# Patient Record
Sex: MALE | Race: WHITE | NOT HISPANIC OR LATINO | Employment: FULL TIME | URBAN - METROPOLITAN AREA
[De-identification: names, ages, dates, MRNs, and addresses within clinical notes are randomized per-mention and may not be internally consistent; named-entity substitution may affect disease eponyms.]

---

## 2017-01-03 ENCOUNTER — OFFICE VISIT (OUTPATIENT)
Dept: UROLOGY | Facility: CLINIC | Age: 42
End: 2017-01-03
Attending: UROLOGY
Payer: COMMERCIAL

## 2017-01-03 VITALS
WEIGHT: 275 LBS | BODY MASS INDEX: 37.25 KG/M2 | DIASTOLIC BLOOD PRESSURE: 87 MMHG | HEART RATE: 98 BPM | HEIGHT: 72 IN | SYSTOLIC BLOOD PRESSURE: 161 MMHG

## 2017-01-03 DIAGNOSIS — R31.29 MICROHEMATURIA: Primary | ICD-10-CM

## 2017-01-03 PROCEDURE — 1159F MED LIST DOCD IN RCRD: CPT | Mod: S$GLB,,, | Performed by: UROLOGY

## 2017-01-03 PROCEDURE — 99204 OFFICE O/P NEW MOD 45 MIN: CPT | Mod: 25,S$GLB,, | Performed by: UROLOGY

## 2017-01-03 PROCEDURE — 81002 URINALYSIS NONAUTO W/O SCOPE: CPT | Mod: S$GLB,,, | Performed by: UROLOGY

## 2017-01-03 PROCEDURE — 99999 PR PBB SHADOW E&M-NEW PATIENT-LVL III: CPT | Mod: PBBFAC,,, | Performed by: UROLOGY

## 2017-01-03 RX ORDER — ATORVASTATIN CALCIUM 20 MG/1
20 TABLET, FILM COATED ORAL DAILY
COMMUNITY
Start: 2016-11-21

## 2017-01-03 RX ORDER — PANTOPRAZOLE SODIUM 40 MG/1
TABLET, DELAYED RELEASE ORAL
Refills: 3 | COMMUNITY
Start: 2016-11-18

## 2017-01-03 RX ORDER — HYDROCHLOROTHIAZIDE 12.5 MG/1
12.5 CAPSULE ORAL DAILY
COMMUNITY
Start: 2016-11-21

## 2017-01-03 RX ORDER — LISINOPRIL 10 MG/1
TABLET ORAL DAILY
COMMUNITY
Start: 2016-11-21

## 2017-01-03 NOTE — PROGRESS NOTES
Subjective:      Aaron Hogan is a 41 y.o. male who was referred by Dr Villeda for evaluation of microscopic hematuria.    Life insurance physical showed 11 red blood cells per high-power field.  Otherwise he urine was negative.  The patient has no urinary symptoms.  No flank pain.  No history of kidney stones.  His father was a previous patient of mine who passed away from metastatic kidney cancer number of years ago      nonsmoker      The following portions of the patient's history were reviewed and updated as appropriate: allergies, current medications, past family history, past medical history, past social history, past surgical history and problem list.    Review of Systems  Constitutional: no fever or chills  ENT: no nasal congestion or sore throat  Respiratory: no cough or shortness of breath  Cardiovascular: no chest pain or palpitations  Gastrointestinal: no nausea or vomiting, tolerating diet  Genitourinary: as per HPI  Hematologic/Lymphatic: no easy bruising or lymphadenopathy  Musculoskeletal: no arthralgias or myalgias  Neurological: no seizures or tremors  Behavioral/Psych: no auditory or visual hallucinations     Objective:   Vitals:   Visit Vitals    BP (!) 161/87 (BP Location: Right arm, Patient Position: Sitting, BP Method: Automatic)    Pulse 98    Ht 6' (1.829 m)    Wt 124.7 kg (275 lb)    BMI 37.3 kg/m2       Physical Exam   General: alert and oriented, no acute distress  Head: normocephalic, atraumatic  Neck: supple, no lymphadenopathy, normal ROM  Respiratory: Symmetric expansion, non-labored breathing  Cardiovascular: no peripheral edema  Abdomen: soft, non tender, non distended, no palpable masses, no hernias, no hepatomegaly or splenomegaly  Genitourinary:   Penis: normal, no lesions, patent orthotopic meatus, no plaques  Scrotum: no rashes or skin changes;   Testes: descended bilaterally, no masses, nontender, normal epididymides bilaterally, no hydroceles  Prostate: normal for  age, normal consistency, non tender, no specific nodules; seminal vesicles not palpated  Rectum: normal rectal tone, no rectal mass, normal perineum  Lymphatic: no inguinal nodes  Skin: normal coloration and turgor, no rashes, no suspicious skin lesions noted  Neuro: alert and oriented x3, no gross deficits  Psych: normal judgment and insight, normal mood/affect and non-anxious    Physical Exam    Lab Review   Urinalysis demonstrates microscopic urinalysis 3 red blood cells per high-power field otherwise negative    No results found for: WBC, HGB, HCT, MCV, PLT  No results found for: CREATININE, BUN  No results found for: PSA  Imaging  -  Assessment:     1. Microhematuria        Plan:     Orders Placed This Encounter    Cystoscopy    CT Urogram Abd Pelvis W WO

## 2017-01-03 NOTE — MR AVS SNAPSHOT
[facility]  [address] [place] [address], [address]  [facility] [address]  Phone: [phone]                  Aaron Hogan   1/3/2017 2:15 PM   Office Visit    Description:  Male : 1975   Provider:  Dimitri Villegas MD   Department:  [facility]           Reason for Visit     Advice Only           Diagnoses this Visit        Comments    Microhematuria    -  Primary            To Do List           Goals (5 Years of Data)     None      [facility]     [facility] Nurse Care Line -  Assistance  Registered nurses in the [facility] On Call Center provide clinical advisement, health education, appointment booking, and other advisory services.  Call for this free service at [phone].             Medications           Message regarding Medications     Verify the changes and/or additions to your medication regime listed below are the same as discussed with your clinician today.  If any of these changes or additions are incorrect, please notify your healthcare provider.             Verify that the below list of medications is an accurate representation of the medications you are currently taking.  If none reported, the list may be blank. If incorrect, please contact your healthcare provider. Carry this list with you in case of emergency.           Current Medications     atorvastatin (LIPITOR) 20 MG tablet Take 20 mg by mouth once daily.     hydrochlorothiazide (MICROZIDE) 12.5 mg capsule Take 12.5 mg by mouth once daily.     lisinopril 10 MG tablet Take by mouth once daily.     pantoprazole (PROTONIX) 40 MG tablet TK 1 T PO week           Clinical Reference Information           Vital Signs - Last Recorded  Most recent update: 1/3/2017  2:20 PM by Mahi Medina RN    BP Pulse Ht Wt BMI    (!) 161/87 (BP Location: Right arm, Patient Position: Sitting, BP Method: Automatic) 98 6' (1.829 m) 124.7 kg (275 lb) 37.3 kg/m2      Blood Pressure          Most Recent Value    BP  (!)  161/87       Allergies as of 1/3/2017     No Known Allergies      Immunizations Administered on Date of Encounter - 1/3/2017     None      Orders Placed During Today's Visit     Future Labs/Procedures Expected by Expires    CT Urogram Abd Pelvis W WO  1/3/2017 1/3/2018    Cystoscopy  As directed 1/3/2018

## 2017-01-04 LAB
BILIRUB SERPL-MCNC: ABNORMAL MG/DL
BLOOD URINE, POC: 250
COLOR, POC UA: ABNORMAL
GLUCOSE UR QL STRIP: ABNORMAL
KETONES UR QL STRIP: ABNORMAL
LEUKOCYTE ESTERASE URINE, POC: ABNORMAL
NITRITE, POC UA: ABNORMAL
PH, POC UA: 7
PROTEIN, POC: ABNORMAL
SPECIFIC GRAVITY, POC UA: 1.01
UROBILINOGEN, POC UA: ABNORMAL

## 2017-01-10 ENCOUNTER — HOSPITAL ENCOUNTER (OUTPATIENT)
Dept: RADIOLOGY | Facility: HOSPITAL | Age: 42
Discharge: HOME | End: 2017-01-10
Attending: UROLOGY
Payer: COMMERCIAL

## 2017-01-10 DIAGNOSIS — R31.29 MICROHEMATURIA: ICD-10-CM

## 2017-01-10 PROCEDURE — 74178 CT ABD&PLV WO CNTR FLWD CNTR: CPT | Mod: 26,,, | Performed by: RADIOLOGY

## 2017-01-10 PROCEDURE — 74178 CT ABD&PLV WO CNTR FLWD CNTR: CPT | Mod: TC

## 2017-01-10 PROCEDURE — 25500020 PHARM REV CODE 255: Performed by: UROLOGY

## 2017-01-10 RX ADMIN — IOHEXOL 125 ML: 350 INJECTION, SOLUTION INTRAVENOUS at 11:01

## 2017-01-13 ENCOUNTER — TELEPHONE (OUTPATIENT)
Dept: UROLOGY | Facility: CLINIC | Age: 42
End: 2017-01-13

## 2017-01-13 ENCOUNTER — PROCEDURE VISIT (OUTPATIENT)
Dept: UROLOGY | Facility: CLINIC | Age: 42
End: 2017-01-13
Attending: UROLOGY
Payer: COMMERCIAL

## 2017-01-13 VITALS
SYSTOLIC BLOOD PRESSURE: 150 MMHG | DIASTOLIC BLOOD PRESSURE: 91 MMHG | HEIGHT: 72 IN | BODY MASS INDEX: 37.25 KG/M2 | WEIGHT: 275 LBS | HEART RATE: 105 BPM

## 2017-01-13 DIAGNOSIS — R31.29 MICROHEMATURIA: ICD-10-CM

## 2017-01-13 PROCEDURE — 52000 CYSTOURETHROSCOPY: CPT | Mod: S$GLB,,, | Performed by: UROLOGY

## 2017-01-13 RX ORDER — CIPROFLOXACIN 500 MG/1
500 TABLET ORAL
Status: COMPLETED | OUTPATIENT
Start: 2017-01-13 | End: 2017-01-13

## 2017-01-13 RX ORDER — LIDOCAINE HYDROCHLORIDE 20 MG/ML
JELLY TOPICAL
Status: COMPLETED | OUTPATIENT
Start: 2017-01-13 | End: 2017-01-13

## 2017-01-13 RX ADMIN — CIPROFLOXACIN 500 MG: 500 TABLET ORAL at 02:01

## 2017-01-13 RX ADMIN — LIDOCAINE HYDROCHLORIDE: 20 JELLY TOPICAL at 02:01

## 2017-01-13 NOTE — TELEPHONE ENCOUNTER
----- Message from Dimirti Villegas MD sent at 1/13/2017  3:30 PM CST -----  Please fax CT results to dr laura gerber    Thanks  sc

## 2017-01-13 NOTE — MR AVS SNAPSHOT
[facility]  [address] [place] [address], [address]  [facility] [address]  Phone: [phone]                  Aaron Hogan   2017 2:45 PM   Procedure visit    Description:  Male : 1975   Provider:  Dimitri Villegas MD   Department:  [facility]           Reason for Visit     Follow-up           Diagnoses this Visit        Comments    Microhematuria                To Do List           Goals (5 Years of Data)     None      Follow-Up and Disposition     Return in about 6 months (around 2017).      [facility]     [facility] On Call Nurse Care Line -  Assistance  Registered nurses in the [facility] On Call Center provide clinical advisement, health education, appointment booking, and other advisory services.  Call for this free service at [phone].             Medications           Message regarding Medications     Verify the changes and/or additions to your medication regime listed below are the same as discussed with your clinician today.  If any of these changes or additions are incorrect, please notify your healthcare provider.        These medications were administered today        Dose Freq    lidocaine HCl 2% urojet  Clinic/HOD 1 time    Sig: by Mucous Membrane route one time.    Class: Normal    Route: Mucous Membrane    ciprofloxacin HCl tablet 500 mg 500 mg Clinic/HOD 1 time    Sig: Take 1 tablet (500 mg total) by mouth one time.    Class: Normal    Route: Oral           Verify that the below list of medications is an accurate representation of the medications you are currently taking.  If none reported, the list may be blank. If incorrect, please contact your healthcare provider. Carry this list with you in case of emergency.           Current Medications     atorvastatin (LIPITOR) 20 MG tablet Take 20 mg by mouth once daily.     hydrochlorothiazide (MICROZIDE) 12.5 mg capsule Take 12.5 mg by mouth once daily.     lisinopril 10 MG tablet Take by mouth once daily.      pantoprazole (PROTONIX) 40 MG tablet TK 1 T PO week           Clinical Reference Information           Vital Signs - Last Recorded  Most recent update: 1/13/2017  3:06 PM by Mahi Medina RN    BP Pulse Ht Wt BMI    (!) 150/91 (BP Location: Left arm, Patient Position: Sitting, BP Method: Automatic) 105 6' (1.829 m) 124.7 kg (275 lb) 37.3 kg/m2      Blood Pressure          Most Recent Value    BP  (!)  150/91      Allergies as of 1/13/2017     No Known Allergies      Immunizations Administered on Date of Encounter - 1/13/2017     None      Orders Placed During Today's Visit      Normal Orders This Visit    Cystoscopy

## 2017-01-13 NOTE — PROCEDURES
"Cystoscopy  Date/Time: 1/13/2017 3:28 PM  Performed by: STANLEY GUO  Authorized by: STANLEY GUO     Consent Done?:  Yes (Written)  Time out: Immediately prior to procedure a "time out" was called to verify the correct patient, procedure, equipment, support staff and site/side marked as required.    Position:  Supine  Anesthesia:  Lidocaine jelly  Patient sedated?: No    Preparation: Patient was prepped and draped in usual sterile fashion      Scope type:  Flexible cystoscope  External exam normal: Yes    Urethra normal: Yes    Prostate normal: Yes  Bladder neck normal: Bladder neck normal   Bladder normal: Yes      Patient tolerance:  Patient tolerated the procedure well with no immediate complications     CT urogram negative; diverticulosis; simple renal cyst    Microhematuria; negative evaluation; no evidence of  disease    RTC 6 months          "

## 2019-01-31 ENCOUNTER — OFFICE VISIT (OUTPATIENT)
Dept: URGENT CARE | Facility: CLINIC | Age: 44
End: 2019-01-31
Payer: COMMERCIAL

## 2019-01-31 VITALS
RESPIRATION RATE: 16 BRPM | BODY MASS INDEX: 36.57 KG/M2 | SYSTOLIC BLOOD PRESSURE: 126 MMHG | HEIGHT: 72 IN | DIASTOLIC BLOOD PRESSURE: 89 MMHG | OXYGEN SATURATION: 98 % | WEIGHT: 270 LBS | HEART RATE: 72 BPM | TEMPERATURE: 97 F

## 2019-01-31 DIAGNOSIS — M46.1 SACROILIITIS: Primary | ICD-10-CM

## 2019-01-31 DIAGNOSIS — M54.17 LUMBOSACRAL RADICULOPATHY: ICD-10-CM

## 2019-01-31 PROCEDURE — 3008F BODY MASS INDEX DOCD: CPT | Mod: CPTII,S$GLB,, | Performed by: EMERGENCY MEDICINE

## 2019-01-31 PROCEDURE — 99203 PR OFFICE/OUTPT VISIT, NEW, LEVL III, 30-44 MIN: ICD-10-PCS | Mod: 25,S$GLB,, | Performed by: EMERGENCY MEDICINE

## 2019-01-31 PROCEDURE — 3008F PR BODY MASS INDEX (BMI) DOCUMENTED: ICD-10-PCS | Mod: CPTII,S$GLB,, | Performed by: EMERGENCY MEDICINE

## 2019-01-31 PROCEDURE — 96372 THER/PROPH/DIAG INJ SC/IM: CPT | Mod: S$GLB,,, | Performed by: EMERGENCY MEDICINE

## 2019-01-31 PROCEDURE — 96372 PR INJECTION,THERAP/PROPH/DIAG2ST, IM OR SUBCUT: ICD-10-PCS | Mod: S$GLB,,, | Performed by: EMERGENCY MEDICINE

## 2019-01-31 PROCEDURE — 99203 OFFICE O/P NEW LOW 30 MIN: CPT | Mod: 25,S$GLB,, | Performed by: EMERGENCY MEDICINE

## 2019-01-31 RX ORDER — BETAMETHASONE SODIUM PHOSPHATE AND BETAMETHASONE ACETATE 3; 3 MG/ML; MG/ML
9 INJECTION, SUSPENSION INTRA-ARTICULAR; INTRALESIONAL; INTRAMUSCULAR; SOFT TISSUE
Status: COMPLETED | OUTPATIENT
Start: 2019-01-31 | End: 2019-01-31

## 2019-01-31 RX ORDER — CYCLOBENZAPRINE HCL 10 MG
10 TABLET ORAL 3 TIMES DAILY PRN
Qty: 30 TABLET | Refills: 1 | Status: SHIPPED | OUTPATIENT
Start: 2019-01-31 | End: 2019-02-10

## 2019-01-31 RX ORDER — METHYLPREDNISOLONE 4 MG/1
TABLET ORAL
Qty: 1 PACKAGE | Refills: 0 | Status: SHIPPED | OUTPATIENT
Start: 2019-01-31 | End: 2021-02-28

## 2019-01-31 RX ORDER — MELOXICAM 15 MG/1
15 TABLET ORAL DAILY
Qty: 30 TABLET | Refills: 2 | Status: SHIPPED | OUTPATIENT
Start: 2019-01-31 | End: 2019-05-01

## 2019-01-31 RX ADMIN — BETAMETHASONE SODIUM PHOSPHATE AND BETAMETHASONE ACETATE 9 MG: 3; 3 INJECTION, SUSPENSION INTRA-ARTICULAR; INTRALESIONAL; INTRAMUSCULAR; SOFT TISSUE at 10:01

## 2019-01-31 NOTE — PATIENT INSTRUCTIONS
No heavy lifting  Heating pad or hot tub in the morning and ice pack in the evening or night  Celestone shot 1.5 cc given in clinic  Medrol Dosepak prescription for the full course as treatment regimen.  Flexeril prescription while taking the Medrol Dosepak as treatment regimen.  After done with the Medrol Dosepak, take the Mobic (meloxicam) daily as anti-inflammatory/pain medicine.  Also okay to take Flexeril when having muscle stiffness or spasm or symptoms at night as this medicine does cause sedation.    Consider injections versus escalation of therapy and imaging with MRIs and consultation with Dr. Pierson with Orthopedics or neuro surgery in particular with Dr. Pierson as you have already planned.  Consider massage and physical therapy as well.    Review sacroiliitis sheet  Review lumbosacral radiculopathy sheet.    Return for any concerns or problems.    Sacroiliitis  The sacrum is the triangle-shaped bone at the base of the spine. It is linked to the other pelvis bones by the sacroiliac joints, also called SI joints. Sacroiliitis is when one or both SI joints are hurt or inflamed. It can make small movements of the lower back and pelvis very painful.        This condition has been linked to other diseases. They include ankylosing spondylitis, rheumatoid arthritis, psoriasis, and Crohns disease or colitis. Symptoms may include pain or stiffness in the hips, lower back, thighs, or buttocks. Pain occurs most often in the morning or after sitting for long periods of time. The pain may get worse when you walk. The swinging motion of the hips strains the SI joints.  Sacroiliitis is caused by many factors such as:  · Heavy lifting, especially if not done the right way  · Severe injury, such as a fall or car accident  · Osteoarthritis  · Pregnancy  · Infection of the joint  This condition is hard to diagnose. It may be confused with other causes of low back pain. To confirm the diagnosis, you may be given a shot of  numbing medicine in the SI joint. Treatment includes rest, physical therapy, and anti-inflammatory medicines. If another health problem is the cause, then that must also be treated. More testing may be needed if your symptoms dont get better.  Home care  · If your healthcare provider has prescribed medicines, take all of them as directed.  · You may use over-the-counter pain medicine to control pain, unless another medicine was prescribed. If prednisone was prescribed, dont use NSAIDs, or nonsteroidal anti-inflammatory drugs, such as ibuprofen or naproxen. Talk with your provider before using this medicine if you have chronic liver or kidney disease or ever had a stomach ulcer or GI bleeding.  · If you were referred to physical therapy, make an appointment. Be sure to do any prescribed exercises.  · Dont smoke. Smoking reduces blood flow to the inflamed area. This makes it harder to treat.  Follow-up care  Follow up with your healthcare provider, or as advised.  If you had an X-ray or an MRI, you will be notified of any new findings that may affect your care.  When to seek medical advice  Contact your healthcare provider right away if any of these occur:  · Increasing low back pain  · Inflammation of the eyes  · Skin rash or redness  · Weakness or numbness in one or both legs  · Loss of bowel or bladder control  · Numbness in the groin area  Date Last Reviewed: 11/24/2015  © 9440-2532 [employer]. [address] [place] [address]. All rights reserved. This information is not intended as a substitute for professional medical care. Always follow your healthcare professional's instructions.        Causes of Lumbar (Low Back) Pain  Low back pain can be caused by problems with any part of the lumbar spine. A disk can herniate (push out) and press on a nerve. Vertebrae can rub against each other or slip out of place. This can irritate facet joints and nerves. It can also lead to stenosis, a  narrowing of the spinal canal or foramen.  Pressure from a disk  Constant wear and tear on a disk can cause it to weaken and push outward. Part of the disk may then press on nearby nerves. There are two common types of herniated disks:  Contained means the soft nucleus is protruding outward.   Extruded means the firm annulus has torn, letting the soft center squeeze through.     Pressure from bone  An unstable spine   With age, a disk may thin and wear out. Vertebrae above and below the disk may begin to touch. This can put pressure on nerves. It can also cause bone spurs (growths) to form where the bones rub together.    Stenosis results when bone spurs narrow the foramen or spinal canal. This also puts pressure on nerves. Slipping vertebrae can irritate nerves and joints. They can also worsen stenosis.    In some cases, vertebrae become unstable and slip forward. This is called spondylolisthesis.     Date Last Reviewed: 10/12/2015  © 9479-6415 [employer]. [address] [place] [address], [place] [address]. All rights reserved. This information is not intended as a substitute for professional medical care. Always follow your healthcare professional's instructions.

## 2019-01-31 NOTE — PROGRESS NOTES
Subjective:       Patient ID: Aaron Hogan is a 44 y.o. male.    Vitals:    01/31/19 0949   BP: 126/89   Pulse: 72   Resp: 16   Temp: 97 °F (36.1 °C)   SpO2: 98%   Weight: 122.5 kg (270 lb)   Height: 6' (1.829 m)       Chief Complaint: Back Pain    Pt states right sided  recurrent back pain that returned 3 days ago.  REPORTS THAT THIS IS HAPPEN PLENTY TIMES IN THE PAST HOWEVER INTENSE OVER THE LAST 3 DAYS KEEPING HIM FROM SLEEPING AND WORKING ESPECIALLY WHILE WALKING AND GOING UP STAIRS AND LEANING FORWARD.  THE PAIN IS SHARP AND LANCINATING AT THE RIGHT POSTERIOR LOW BACK AT THE SI JOINT.  IT RADIATES DOWN AND ACTUALLY WRAPPED AROUND ANTERIORLY TO THE GROIN.  THERE IS NO MIDLINE BACK PAIN.  THERE HAS BEEN NO TRAUMA OR FALLS OR URINARY SYMPTOMS, NO HEMATURIA, NO BOWEL OR BLADDER INCONTINENCE NOR RETENTION, NO LOWER EXTREMITY WEAKNESS.  NO RASH.      Back Pain   This is a chronic problem. The current episode started in the past 7 days. The problem occurs constantly. The problem is unchanged. The pain is present in the lumbar spine and sacro-iliac. The pain does not radiate. The pain is at a severity of 7/10. The symptoms are aggravated by bending. Pertinent negatives include no abdominal pain, chest pain, fever or headaches. Treatments tried: aleve. The treatment provided no relief.     Review of Systems   Constitution: Negative for chills and fever.   HENT: Negative for sore throat.    Eyes: Negative for blurred vision.   Cardiovascular: Negative for chest pain.   Respiratory: Negative for shortness of breath.    Skin: Negative for rash.   Musculoskeletal: Positive for back pain. Negative for joint pain.   Gastrointestinal: Negative for abdominal pain, diarrhea, nausea and vomiting.   Neurological: Negative for headaches.   Psychiatric/Behavioral: The patient is not nervous/anxious.        Objective:      Physical Exam   Constitutional: He is oriented to person, place, and time. Vital signs are normal. He  appears well-developed and well-nourished. He is active and cooperative. No distress.   HENT:   Head: Normocephalic and atraumatic.   Nose: Nose normal.   Mouth/Throat: Oropharynx is clear and moist and mucous membranes are normal.   Eyes: Conjunctivae and lids are normal.   Neck: Trachea normal, normal range of motion, full passive range of motion without pain and phonation normal. Neck supple.   Cardiovascular: Normal rate, regular rhythm, normal heart sounds, intact distal pulses and normal pulses.   Pulmonary/Chest: Effort normal and breath sounds normal.   Abdominal: Soft. Normal appearance and bowel sounds are normal. He exhibits no abdominal bruit, no pulsatile midline mass and no mass.   Musculoskeletal: He exhibits tenderness (MARKED TENDERNESS TO PALPATION WHICH REPRODUCES PAIN AT THE RIGHT SI JOINT.  CONSISTENT WITH SACROILIITIS WITH VARYING DEGREE OF RADICULOPATHY FROM THE RIGHT LOW BACK). He exhibits no edema or deformity.   Neurological: He is alert and oriented to person, place, and time. He has normal strength and normal reflexes. No sensory deficit.   NEGATIVE STRAIGHT LEG RAISE   Skin: Skin is warm, dry and intact. He is not diaphoretic.   Psychiatric: He has a normal mood and affect. His speech is normal and behavior is normal. Cognition and memory are normal.   Nursing note and vitals reviewed.      Assessment:       1. Sacroiliitis    2. Lumbosacral radiculopathy        Plan:       Aaron was seen today for back pain.    Diagnoses and all orders for this visit:    Sacroiliitis    Lumbosacral radiculopathy    Other orders  -     betamethasone acetate-betamethasone sodium phosphate injection 9 mg  -     methylPREDNISolone (MEDROL DOSEPACK) 4 mg tablet; use as directed on package until gone  -     meloxicam (MOBIC) 15 MG tablet; Take 1 tablet (15 mg total) by mouth once daily.  -     cyclobenzaprine (FLEXERIL) 10 MG tablet; Take 1 tablet (10 mg total) by mouth 3 (three) times daily as needed for  Muscle spasms.          Patient Instructions     No heavy lifting  Heating pad or hot tub in the morning and ice pack in the evening or night  Celestone shot 1.5 cc given in clinic  Medrol Dosepak prescription for the full course as treatment regimen.  Flexeril prescription while taking the Medrol Dosepak as treatment regimen.  After done with the Medrol Dosepak, take the Mobic (meloxicam) daily as anti-inflammatory/pain medicine.  Also okay to take Flexeril when having muscle stiffness or spasm or symptoms at night as this medicine does cause sedation.    Consider injections versus escalation of therapy and imaging with MRIs and consultation with Dr. Pierson with Orthopedics or neuro surgery in particular with Dr. Pierson as you have already planned.  Consider massage and physical therapy as well.    Review sacroiliitis sheet  Review lumbosacral radiculopathy sheet.    Return for any concerns or problems.    Sacroiliitis  The sacrum is the triangle-shaped bone at the base of the spine. It is linked to the other pelvis bones by the sacroiliac joints, also called SI joints. Sacroiliitis is when one or both SI joints are hurt or inflamed. It can make small movements of the lower back and pelvis very painful.        This condition has been linked to other diseases. They include ankylosing spondylitis, rheumatoid arthritis, psoriasis, and Crohns disease or colitis. Symptoms may include pain or stiffness in the hips, lower back, thighs, or buttocks. Pain occurs most often in the morning or after sitting for long periods of time. The pain may get worse when you walk. The swinging motion of the hips strains the SI joints.  Sacroiliitis is caused by many factors such as:  · Heavy lifting, especially if not done the right way  · Severe injury, such as a fall or car accident  · Osteoarthritis  · Pregnancy  · Infection of the joint  This condition is hard to diagnose. It may be confused with other causes of low back pain. To  confirm the diagnosis, you may be given a shot of numbing medicine in the SI joint. Treatment includes rest, physical therapy, and anti-inflammatory medicines. If another health problem is the cause, then that must also be treated. More testing may be needed if your symptoms dont get better.  Home care  · If your healthcare provider has prescribed medicines, take all of them as directed.  · You may use over-the-counter pain medicine to control pain, unless another medicine was prescribed. If prednisone was prescribed, dont use NSAIDs, or nonsteroidal anti-inflammatory drugs, such as ibuprofen or naproxen. Talk with your provider before using this medicine if you have chronic liver or kidney disease or ever had a stomach ulcer or GI bleeding.  · If you were referred to physical therapy, make an appointment. Be sure to do any prescribed exercises.  · Dont smoke. Smoking reduces blood flow to the inflamed area. This makes it harder to treat.  Follow-up care  Follow up with your healthcare provider, or as advised.  If you had an X-ray or an MRI, you will be notified of any new findings that may affect your care.  When to seek medical advice  Contact your healthcare provider right away if any of these occur:  · Increasing low back pain  · Inflammation of the eyes  · Skin rash or redness  · Weakness or numbness in one or both legs  · Loss of bowel or bladder control  · Numbness in the groin area  Date Last Reviewed: 11/24/2015  © 0010-3135 [employer]. [address] [place] [address]. All rights reserved. This information is not intended as a substitute for professional medical care. Always follow your healthcare professional's instructions.        Causes of Lumbar (Low Back) Pain  Low back pain can be caused by problems with any part of the lumbar spine. A disk can herniate (push out) and press on a nerve. Vertebrae can rub against each other or slip out of place. This can irritate facet joints  and nerves. It can also lead to stenosis, a narrowing of the spinal canal or foramen.  Pressure from a disk  Constant wear and tear on a disk can cause it to weaken and push outward. Part of the disk may then press on nearby nerves. There are two common types of herniated disks:  Contained means the soft nucleus is protruding outward.   Extruded means the firm annulus has torn, letting the soft center squeeze through.     Pressure from bone  An unstable spine   With age, a disk may thin and wear out. Vertebrae above and below the disk may begin to touch. This can put pressure on nerves. It can also cause bone spurs (growths) to form where the bones rub together.    Stenosis results when bone spurs narrow the foramen or spinal canal. This also puts pressure on nerves. Slipping vertebrae can irritate nerves and joints. They can also worsen stenosis.    In some cases, vertebrae become unstable and slip forward. This is called spondylolisthesis.     Date Last Reviewed: 10/12/2015  © 5485-6866 [employer]. [address] [place] [address], [place] [address]. All rights reserved. This information is not intended as a substitute for professional medical care. Always follow your healthcare professional's instructions.

## 2020-06-17 ENCOUNTER — LAB VISIT (OUTPATIENT)
Dept: PRIMARY CARE CLINIC | Facility: OTHER | Age: 45
End: 2020-06-17
Payer: COMMERCIAL

## 2020-06-17 DIAGNOSIS — Z03.818 ENCOUNTER FOR OBSERVATION FOR SUSPECTED EXPOSURE TO OTHER BIOLOGICAL AGENTS RULED OUT: ICD-10-CM

## 2020-06-17 PROCEDURE — U0003 INFECTIOUS AGENT DETECTION BY NUCLEIC ACID (DNA OR RNA); SEVERE ACUTE RESPIRATORY SYNDROME CORONAVIRUS 2 (SARS-COV-2) (CORONAVIRUS DISEASE [COVID-19]), AMPLIFIED PROBE TECHNIQUE, MAKING USE OF HIGH THROUGHPUT TECHNOLOGIES AS DESCRIBED BY CMS-2020-01-R: HCPCS

## 2020-06-19 LAB — SARS-COV-2 RNA RESP QL NAA+PROBE: NOT DETECTED

## 2021-02-28 ENCOUNTER — OFFICE VISIT (OUTPATIENT)
Dept: URGENT CARE | Facility: CLINIC | Age: 46
End: 2021-02-28
Payer: COMMERCIAL

## 2021-02-28 VITALS
OXYGEN SATURATION: 98 % | DIASTOLIC BLOOD PRESSURE: 89 MMHG | HEART RATE: 76 BPM | WEIGHT: 270 LBS | RESPIRATION RATE: 18 BRPM | BODY MASS INDEX: 36.57 KG/M2 | SYSTOLIC BLOOD PRESSURE: 145 MMHG | TEMPERATURE: 97 F | HEIGHT: 72 IN

## 2021-02-28 DIAGNOSIS — M10.9 ACUTE GOUT INVOLVING TOE OF RIGHT FOOT, UNSPECIFIED CAUSE: Primary | ICD-10-CM

## 2021-02-28 PROCEDURE — 3008F BODY MASS INDEX DOCD: CPT | Mod: CPTII,S$GLB,, | Performed by: NURSE PRACTITIONER

## 2021-02-28 PROCEDURE — 99213 PR OFFICE/OUTPT VISIT, EST, LEVL III, 20-29 MIN: ICD-10-PCS | Mod: 25,S$GLB,, | Performed by: NURSE PRACTITIONER

## 2021-02-28 PROCEDURE — 99213 OFFICE O/P EST LOW 20 MIN: CPT | Mod: 25,S$GLB,, | Performed by: NURSE PRACTITIONER

## 2021-02-28 PROCEDURE — 96372 PR INJECTION,THERAP/PROPH/DIAG2ST, IM OR SUBCUT: ICD-10-PCS | Mod: S$GLB,,, | Performed by: NURSE PRACTITIONER

## 2021-02-28 PROCEDURE — 3008F PR BODY MASS INDEX (BMI) DOCUMENTED: ICD-10-PCS | Mod: CPTII,S$GLB,, | Performed by: NURSE PRACTITIONER

## 2021-02-28 PROCEDURE — 96372 THER/PROPH/DIAG INJ SC/IM: CPT | Mod: S$GLB,,, | Performed by: NURSE PRACTITIONER

## 2021-02-28 RX ORDER — COLCHICINE 0.6 MG/1
TABLET ORAL
Qty: 6 TABLET | Refills: 0 | Status: SHIPPED | OUTPATIENT
Start: 2021-02-28

## 2021-02-28 RX ORDER — COLCHICINE 0.6 MG/1
TABLET ORAL
Qty: 6 TABLET | Refills: 0 | Status: SHIPPED | OUTPATIENT
Start: 2021-02-28 | End: 2021-02-28 | Stop reason: SDUPTHER

## 2021-02-28 RX ORDER — INDOMETHACIN 50 MG/1
50 CAPSULE ORAL 3 TIMES DAILY
Qty: 15 CAPSULE | Refills: 0 | Status: SHIPPED | OUTPATIENT
Start: 2021-02-28 | End: 2021-02-28 | Stop reason: SDUPTHER

## 2021-02-28 RX ORDER — BETAMETHASONE SODIUM PHOSPHATE AND BETAMETHASONE ACETATE 3; 3 MG/ML; MG/ML
9 INJECTION, SUSPENSION INTRA-ARTICULAR; INTRALESIONAL; INTRAMUSCULAR; SOFT TISSUE ONCE
Status: COMPLETED | OUTPATIENT
Start: 2021-02-28 | End: 2021-02-28

## 2021-02-28 RX ORDER — INDOMETHACIN 50 MG/1
50 CAPSULE ORAL 3 TIMES DAILY
Qty: 15 CAPSULE | Refills: 0 | Status: SHIPPED | OUTPATIENT
Start: 2021-02-28

## 2021-02-28 RX ADMIN — BETAMETHASONE SODIUM PHOSPHATE AND BETAMETHASONE ACETATE 9 MG: 3; 3 INJECTION, SUSPENSION INTRA-ARTICULAR; INTRALESIONAL; INTRAMUSCULAR; SOFT TISSUE at 11:02

## 2021-03-17 ENCOUNTER — IMMUNIZATION (OUTPATIENT)
Dept: INTERNAL MEDICINE | Facility: CLINIC | Age: 46
End: 2021-03-17
Payer: COMMERCIAL

## 2021-03-17 DIAGNOSIS — Z23 NEED FOR VACCINATION: Primary | ICD-10-CM

## 2021-03-17 PROCEDURE — 91300 COVID-19, MRNA, LNP-S, PF, 30 MCG/0.3 ML DOSE VACCINE: CPT | Mod: PBBFAC | Performed by: INTERNAL MEDICINE

## 2021-04-07 ENCOUNTER — IMMUNIZATION (OUTPATIENT)
Dept: INTERNAL MEDICINE | Facility: CLINIC | Age: 46
End: 2021-04-07
Payer: COMMERCIAL

## 2021-04-07 DIAGNOSIS — Z23 NEED FOR VACCINATION: Primary | ICD-10-CM

## 2021-04-07 PROCEDURE — 91300 COVID-19, MRNA, LNP-S, PF, 30 MCG/0.3 ML DOSE VACCINE: CPT | Mod: PBBFAC | Performed by: INTERNAL MEDICINE

## 2021-04-07 PROCEDURE — 0002A COVID-19, MRNA, LNP-S, PF, 30 MCG/0.3 ML DOSE VACCINE: CPT | Mod: PBBFAC | Performed by: INTERNAL MEDICINE

## 2024-07-31 ENCOUNTER — OFFICE VISIT (OUTPATIENT)
Dept: URGENT CARE | Facility: CLINIC | Age: 49
End: 2024-07-31
Payer: COMMERCIAL

## 2024-07-31 VITALS
RESPIRATION RATE: 18 BRPM | WEIGHT: 270 LBS | SYSTOLIC BLOOD PRESSURE: 146 MMHG | HEIGHT: 72 IN | BODY MASS INDEX: 36.57 KG/M2 | HEART RATE: 75 BPM | OXYGEN SATURATION: 95 % | TEMPERATURE: 98 F | DIASTOLIC BLOOD PRESSURE: 82 MMHG
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
## 2024-07-31 DIAGNOSIS — M79.18 MUSCULOSKELETAL PAIN: ICD-10-CM

## 2024-07-31 PROCEDURE — 99203 OFFICE O/P NEW LOW 30 MIN: CPT | Mod: S$GLB,,, | Performed by: FAMILY MEDICINE

## 2024-07-31 RX ORDER — IBUPROFEN 800 MG/1
800 TABLET ORAL 3 TIMES DAILY PRN
Qty: 21 TABLET | Refills: 0 | Status: SHIPPED | OUTPATIENT
Start: 2024-07-31 | End: 2024-08-07

## 2024-07-31 RX ORDER — CYCLOBENZAPRINE HCL 10 MG
10 TABLET ORAL 3 TIMES DAILY PRN
Qty: 30 TABLET | Refills: 0 | Status: SHIPPED | OUTPATIENT
Start: 2024-07-31 | End: 2024-08-10
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
## 2024-07-31 NOTE — PATIENT INSTRUCTIONS
Follow up with a primary care provider in 7 days; may return to urgent care if needed.    Activity modification. Heat/ cold therapy; topical Biofreeze, Aspercreme, Lidocaine OTC patches, Diclofenac gel. Tylenol and/or NSAIDs as needed. Careful with GI and renal adverse events with NSAIDs.   Exercises for strengthening and increased range of motion; supervised vs home vs combination physical therapy. May consider imaging and/or specialist consultation if symptoms do not improve.
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
